# Patient Record
Sex: FEMALE
[De-identification: names, ages, dates, MRNs, and addresses within clinical notes are randomized per-mention and may not be internally consistent; named-entity substitution may affect disease eponyms.]

---

## 2017-01-06 PROBLEM — Z00.00 ENCOUNTER FOR PREVENTIVE HEALTH EXAMINATION: Status: ACTIVE | Noted: 2017-01-06

## 2017-01-09 ENCOUNTER — APPOINTMENT (OUTPATIENT)
Dept: OTOLARYNGOLOGY | Facility: CLINIC | Age: 37
End: 2017-01-09

## 2017-01-09 VITALS
TEMPERATURE: 98.3 F | SYSTOLIC BLOOD PRESSURE: 126 MMHG | HEART RATE: 80 BPM | OXYGEN SATURATION: 100 % | DIASTOLIC BLOOD PRESSURE: 86 MMHG

## 2017-01-09 VITALS — HEIGHT: 63 IN | WEIGHT: 120 LBS | BODY MASS INDEX: 21.26 KG/M2

## 2017-01-09 DIAGNOSIS — Z83.3 FAMILY HISTORY OF DIABETES MELLITUS: ICD-10-CM

## 2017-01-09 DIAGNOSIS — Z78.9 OTHER SPECIFIED HEALTH STATUS: ICD-10-CM

## 2017-01-09 DIAGNOSIS — F15.90 OTHER STIMULANT USE, UNSPECIFIED, UNCOMPLICATED: ICD-10-CM

## 2017-01-09 DIAGNOSIS — K21.9 ACUTE LARYNGITIS: ICD-10-CM

## 2017-01-09 DIAGNOSIS — M26.609 UNSPECIFIED TEMPOROMANDIBULAR JOINT DISORDER: ICD-10-CM

## 2017-01-09 DIAGNOSIS — Z80.8 FAMILY HISTORY OF MALIGNANT NEOPLASM OF OTHER ORGANS OR SYSTEMS: ICD-10-CM

## 2017-01-09 DIAGNOSIS — J04.0 ACUTE LARYNGITIS: ICD-10-CM

## 2017-01-09 RX ORDER — ALPRAZOLAM 0.5 MG/1
0.5 TABLET ORAL
Qty: 30 | Refills: 0 | Status: ACTIVE | COMMUNITY
Start: 2016-10-21

## 2017-01-09 RX ORDER — NORETHINDRONE 0.35 MG/1
0.35 TABLET ORAL
Qty: 28 | Refills: 0 | Status: ACTIVE | COMMUNITY
Start: 2016-12-16

## 2017-02-13 ENCOUNTER — CHART COPY (OUTPATIENT)
Age: 37
End: 2017-02-13

## 2017-03-16 ENCOUNTER — CHART COPY (OUTPATIENT)
Age: 37
End: 2017-03-16

## 2017-04-03 ENCOUNTER — RESULT REVIEW (OUTPATIENT)
Age: 37
End: 2017-04-03

## 2017-04-04 ENCOUNTER — OUTPATIENT (OUTPATIENT)
Dept: OUTPATIENT SERVICES | Facility: HOSPITAL | Age: 37
LOS: 1 days | End: 2017-04-04
Payer: COMMERCIAL

## 2017-04-04 DIAGNOSIS — D44.0 NEOPLASM OF UNCERTAIN BEHAVIOR OF THYROID GLAND: ICD-10-CM

## 2017-04-07 LAB — NON-GYNECOLOGICAL CYTOLOGY STUDY: SIGNIFICANT CHANGE UP

## 2017-04-17 ENCOUNTER — APPOINTMENT (OUTPATIENT)
Dept: OTOLARYNGOLOGY | Facility: CLINIC | Age: 37
End: 2017-04-17

## 2017-04-17 VITALS
TEMPERATURE: 98.6 F | HEART RATE: 73 BPM | DIASTOLIC BLOOD PRESSURE: 91 MMHG | SYSTOLIC BLOOD PRESSURE: 145 MMHG | OXYGEN SATURATION: 99 %

## 2017-04-17 DIAGNOSIS — D44.0 NEOPLASM OF UNCERTAIN BEHAVIOR OF THYROID GLAND: ICD-10-CM

## 2017-04-17 DIAGNOSIS — E04.9 NONTOXIC GOITER, UNSPECIFIED: ICD-10-CM

## 2017-04-17 DIAGNOSIS — R13.19 OTHER DYSPHAGIA: ICD-10-CM

## 2017-04-20 ENCOUNTER — RESULT REVIEW (OUTPATIENT)
Age: 37
End: 2017-04-20

## 2017-04-20 VITALS
WEIGHT: 119.93 LBS | HEIGHT: 63 IN | SYSTOLIC BLOOD PRESSURE: 117 MMHG | HEART RATE: 62 BPM | DIASTOLIC BLOOD PRESSURE: 69 MMHG | RESPIRATION RATE: 16 BRPM | OXYGEN SATURATION: 99 % | TEMPERATURE: 99 F

## 2017-04-20 RX ORDER — NORETHINDRONE ACETATE AND ETHINYL ESTRADIOL 1MG-20(24)
1-20 KIT ORAL
Qty: 28 | Refills: 0 | Status: ACTIVE | COMMUNITY
Start: 2017-04-11

## 2017-04-20 RX ORDER — RANITIDINE HYDROCHLORIDE 150 MG/1
0 TABLET, FILM COATED ORAL
Qty: 0 | Refills: 0 | COMMUNITY

## 2017-04-20 RX ORDER — VALACYCLOVIR 500 MG/1
500 TABLET, FILM COATED ORAL
Qty: 30 | Refills: 0 | Status: ACTIVE | COMMUNITY
Start: 2017-03-21

## 2017-04-20 NOTE — ASU PATIENT PROFILE, ADULT - PMH
GERD with esophagitis    Multinodular goiter (nontoxic) GERD with esophagitis  mild  Multinodular goiter (nontoxic)

## 2017-04-21 ENCOUNTER — OUTPATIENT (OUTPATIENT)
Dept: OUTPATIENT SERVICES | Facility: HOSPITAL | Age: 37
LOS: 1 days | Discharge: HOME | End: 2017-04-21
Payer: COMMERCIAL

## 2017-04-21 ENCOUNTER — APPOINTMENT (OUTPATIENT)
Dept: OTOLARYNGOLOGY | Facility: HOSPITAL | Age: 37
End: 2017-04-21

## 2017-04-21 DIAGNOSIS — Z41.1 ENCOUNTER FOR COSMETIC SURGERY: Chronic | ICD-10-CM

## 2017-04-21 DIAGNOSIS — Z98.82 BREAST IMPLANT STATUS: Chronic | ICD-10-CM

## 2017-04-21 LAB
ALBUMIN SERPL ELPH-MCNC: 3.3 G/DL — LOW (ref 3.4–5)
ANION GAP SERPL CALC-SCNC: 10 MMOL/L — SIGNIFICANT CHANGE UP (ref 9–16)
BUN SERPL-MCNC: 13 MG/DL — SIGNIFICANT CHANGE UP (ref 7–23)
CALCIUM SERPL-MCNC: 8.2 MG/DL — LOW (ref 8.5–10.5)
CHLORIDE SERPL-SCNC: 105 MMOL/L — SIGNIFICANT CHANGE UP (ref 96–108)
CO2 SERPL-SCNC: 23 MMOL/L — SIGNIFICANT CHANGE UP (ref 22–31)
CREAT SERPL-MCNC: 0.75 MG/DL — SIGNIFICANT CHANGE UP (ref 0.5–1.3)
GLUCOSE SERPL-MCNC: 149 MG/DL — HIGH (ref 70–99)
POTASSIUM SERPL-MCNC: 4.1 MMOL/L — SIGNIFICANT CHANGE UP (ref 3.5–5.3)
POTASSIUM SERPL-SCNC: 4.1 MMOL/L — SIGNIFICANT CHANGE UP (ref 3.5–5.3)
PTH-INTACT IO % DIF SERPL: 33 PG/ML — SIGNIFICANT CHANGE UP (ref 8.5–72.5)
PTH-INTACT IO % DIF SERPL: 53.8 PG/ML — SIGNIFICANT CHANGE UP (ref 8.5–72.5)
PTH-INTACT IO % DIF SERPL: 82.5 PG/ML — HIGH (ref 8.5–72.5)
SODIUM SERPL-SCNC: 138 MMOL/L — SIGNIFICANT CHANGE UP (ref 135–145)

## 2017-04-21 PROCEDURE — 60252 REMOVAL OF THYROID: CPT

## 2017-04-21 PROCEDURE — 88321 CONSLTJ&REPRT SLD PREP ELSWR: CPT

## 2017-04-21 PROCEDURE — 95865 NEEDLE EMG LARYNX: CPT | Mod: 26

## 2017-04-21 RX ORDER — HYDROMORPHONE HYDROCHLORIDE 2 MG/ML
0.5 INJECTION INTRAMUSCULAR; INTRAVENOUS; SUBCUTANEOUS
Qty: 0 | Refills: 0 | Status: DISCONTINUED | OUTPATIENT
Start: 2017-04-21 | End: 2017-04-22

## 2017-04-21 RX ORDER — ACETAMINOPHEN WITH CODEINE 300MG-30MG
1 TABLET ORAL EVERY 4 HOURS
Qty: 0 | Refills: 0 | Status: DISCONTINUED | OUTPATIENT
Start: 2017-04-21 | End: 2017-04-22

## 2017-04-21 RX ORDER — ACETAMINOPHEN WITH CODEINE 300MG-30MG
2 TABLET ORAL EVERY 4 HOURS
Qty: 0 | Refills: 0 | Status: DISCONTINUED | OUTPATIENT
Start: 2017-04-21 | End: 2017-04-22

## 2017-04-21 RX ORDER — SODIUM CHLORIDE 9 MG/ML
1000 INJECTION, SOLUTION INTRAVENOUS
Qty: 0 | Refills: 0 | Status: DISCONTINUED | OUTPATIENT
Start: 2017-04-21 | End: 2017-04-22

## 2017-04-21 RX ORDER — HYDROMORPHONE HYDROCHLORIDE 2 MG/ML
0.5 INJECTION INTRAMUSCULAR; INTRAVENOUS; SUBCUTANEOUS EVERY 4 HOURS
Qty: 0 | Refills: 0 | Status: DISCONTINUED | OUTPATIENT
Start: 2017-04-21 | End: 2017-04-21

## 2017-04-21 RX ORDER — HYDROMORPHONE HYDROCHLORIDE 2 MG/ML
1 INJECTION INTRAMUSCULAR; INTRAVENOUS; SUBCUTANEOUS EVERY 4 HOURS
Qty: 0 | Refills: 0 | Status: DISCONTINUED | OUTPATIENT
Start: 2017-04-21 | End: 2017-04-21

## 2017-04-21 RX ORDER — ONDANSETRON 8 MG/1
4 TABLET, FILM COATED ORAL EVERY 6 HOURS
Qty: 0 | Refills: 0 | Status: DISCONTINUED | OUTPATIENT
Start: 2017-04-21 | End: 2017-04-22

## 2017-04-21 RX ADMIN — HYDROMORPHONE HYDROCHLORIDE 0.5 MILLIGRAM(S): 2 INJECTION INTRAMUSCULAR; INTRAVENOUS; SUBCUTANEOUS at 19:27

## 2017-04-21 RX ADMIN — Medication 1 TABLET(S): at 22:35

## 2017-04-21 RX ADMIN — Medication 1 TABLET(S): at 23:15

## 2017-04-21 RX ADMIN — HYDROMORPHONE HYDROCHLORIDE 0.5 MILLIGRAM(S): 2 INJECTION INTRAMUSCULAR; INTRAVENOUS; SUBCUTANEOUS at 19:45

## 2017-04-21 RX ADMIN — ONDANSETRON 4 MILLIGRAM(S): 8 TABLET, FILM COATED ORAL at 22:30

## 2017-04-21 RX ADMIN — SODIUM CHLORIDE 95 MILLILITER(S): 9 INJECTION, SOLUTION INTRAVENOUS at 19:27

## 2017-04-21 NOTE — BRIEF OPERATIVE NOTE - OPERATION/FINDINGS
Preop Dx: Thyroid nodules  Postop Dx: Same as above  Procedure: Total Thyroidectomy  Findings: Total thyroidectomy with excision of central nodes.

## 2017-04-22 ENCOUNTER — TRANSCRIPTION ENCOUNTER (OUTPATIENT)
Age: 37
End: 2017-04-22

## 2017-04-22 VITALS
RESPIRATION RATE: 15 BRPM | OXYGEN SATURATION: 100 % | HEART RATE: 64 BPM | TEMPERATURE: 99 F | SYSTOLIC BLOOD PRESSURE: 120 MMHG | DIASTOLIC BLOOD PRESSURE: 69 MMHG

## 2017-04-22 LAB
ALBUMIN SERPL ELPH-MCNC: 3.2 G/DL — LOW (ref 3.4–5)
ANION GAP SERPL CALC-SCNC: 8 MMOL/L — LOW (ref 9–16)
BUN SERPL-MCNC: 10 MG/DL — SIGNIFICANT CHANGE UP (ref 7–23)
CALCIUM SERPL-MCNC: 7.9 MG/DL — LOW (ref 8.5–10.5)
CHLORIDE SERPL-SCNC: 108 MMOL/L — SIGNIFICANT CHANGE UP (ref 96–108)
CO2 SERPL-SCNC: 24 MMOL/L — SIGNIFICANT CHANGE UP (ref 22–31)
CREAT SERPL-MCNC: 0.71 MG/DL — SIGNIFICANT CHANGE UP (ref 0.5–1.3)
GLUCOSE SERPL-MCNC: 112 MG/DL — HIGH (ref 70–99)
HCT VFR BLD CALC: 36.3 % — SIGNIFICANT CHANGE UP (ref 34.5–45)
HGB BLD-MCNC: 12.1 G/DL — SIGNIFICANT CHANGE UP (ref 11.5–15.5)
MAGNESIUM SERPL-MCNC: 1.9 MG/DL — SIGNIFICANT CHANGE UP (ref 1.6–2.4)
MCHC RBC-ENTMCNC: 29.7 PG — SIGNIFICANT CHANGE UP (ref 27–34)
MCHC RBC-ENTMCNC: 33.3 G/DL — SIGNIFICANT CHANGE UP (ref 32–36)
MCV RBC AUTO: 89 FL — SIGNIFICANT CHANGE UP (ref 80–100)
PHOSPHATE SERPL-MCNC: 3.6 MG/DL — SIGNIFICANT CHANGE UP (ref 2.5–4.5)
PLATELET # BLD AUTO: 215 K/UL — SIGNIFICANT CHANGE UP (ref 150–400)
POTASSIUM SERPL-MCNC: 3.9 MMOL/L — SIGNIFICANT CHANGE UP (ref 3.5–5.3)
POTASSIUM SERPL-SCNC: 3.9 MMOL/L — SIGNIFICANT CHANGE UP (ref 3.5–5.3)
RBC # BLD: 4.08 M/UL — SIGNIFICANT CHANGE UP (ref 3.8–5.2)
RBC # FLD: 13 % — SIGNIFICANT CHANGE UP (ref 10.3–16.9)
SODIUM SERPL-SCNC: 140 MMOL/L — SIGNIFICANT CHANGE UP (ref 135–145)
WBC # BLD: 11.9 K/UL — HIGH (ref 3.8–10.5)
WBC # FLD AUTO: 11.9 K/UL — HIGH (ref 3.8–10.5)

## 2017-04-22 PROCEDURE — 60220 PARTIAL REMOVAL OF THYROID: CPT

## 2017-04-22 PROCEDURE — 88307 TISSUE EXAM BY PATHOLOGIST: CPT

## 2017-04-22 PROCEDURE — 36415 COLL VENOUS BLD VENIPUNCTURE: CPT

## 2017-04-22 PROCEDURE — 38510 BIOPSY/REMOVAL LYMPH NODES: CPT

## 2017-04-22 PROCEDURE — 84100 ASSAY OF PHOSPHORUS: CPT

## 2017-04-22 PROCEDURE — 82040 ASSAY OF SERUM ALBUMIN: CPT

## 2017-04-22 PROCEDURE — 83735 ASSAY OF MAGNESIUM: CPT

## 2017-04-22 PROCEDURE — 85027 COMPLETE CBC AUTOMATED: CPT

## 2017-04-22 PROCEDURE — 80048 BASIC METABOLIC PNL TOTAL CA: CPT

## 2017-04-22 PROCEDURE — 83970 ASSAY OF PARATHORMONE: CPT

## 2017-04-22 RX ADMIN — Medication 1 TABLET(S): at 08:18

## 2017-04-22 RX ADMIN — Medication 1 TABLET(S): at 09:10

## 2017-04-22 RX ADMIN — Medication 1 TABLET(S): at 04:00

## 2017-04-22 RX ADMIN — Medication 1 TABLET(S): at 03:23

## 2017-04-22 NOTE — PROGRESS NOTE ADULT - SUBJECTIVE AND OBJECTIVE BOX
O/N: Afebrile, VSS. Labs wnl. advanced to soft diet for am, HLIVF    STATUS POST: total thyroidectomy     POST OP DAY #: 1 O/N: Afebrile, VSS. Labs wnl. advanced to soft diet for am, HLIVF    STATUS POST: total thyroidectomy     POST OP DAY #: 1    SUBJECTIVE: Pt seen and examined at bedside. No overnight events.  Pain controlled. No f/c/n/v.     Vital Signs Last 24 Hrs  T(C): 37, Max: 37 (04-22 @ 08:16)  T(F): 98.6, Max: 98.6 (04-22 @ 08:16)  HR: 64 (62 - 100)  BP: 120/69 (120/69 - 156/79)  BP(mean): --  RR: 15 (15 - 18)  SpO2: 100% (97% - 100%)    PHYSICAL EXAM    Gen: NAD  CV: RRR  Neck, soft, no hematoma, drain in place  Pulm: CTABL  Abd: Soft, NT/ND    I&O's Detail    I & Os for current day (as of 22 Apr 2017 08:18)  =============================================  IN:    Other: 1300 ml    lactated ringers.: 855 ml    Oral Fluid: 240 ml    Total IN: 2395 ml  ---------------------------------------------  OUT:    Voided: 1050 ml    Other: 50 ml    Bulb: 10 ml    Total OUT: 1110 ml  ---------------------------------------------  Total NET: 1285 ml      LABS:                        12.1   11.9  )-----------( 215      ( 22 Apr 2017 06:54 )             36.3     04-22    140  |  108  |  10  ----------------------------<  112<H>  3.9   |  24  |  0.71    Ca    7.9<L>      22 Apr 2017 06:54  Phos  3.6     04-22  Mg     1.9     04-22    TPro  x   /  Alb  3.2<L>  /  TBili  x   /  DBili  x   /  AST  x   /  ALT  x   /  AlkPhos  x   04-22          MEDICATIONS  (STANDING):    MEDICATIONS  (PRN):  HYDROmorphone  Injectable 0.5milliGRAM(s) IV Push every 1 hour PRN Severe Pain (7 - 10)  acetaminophen 300 mG/codeine 30 mG 1Tablet(s) Oral every 4 hours PRN Moderate Pain (4 - 6)  acetaminophen 300 mG/codeine 30 mG 2Tablet(s) Oral every 4 hours PRN Severe Pain (7 - 10) - try PO option first  ondansetron Injectable 4milliGRAM(s) IV Push every 6 hours PRN Nausea and/or Vomiting      RADIOLOGY & ADDITIONAL STUDIES:    ASSESSMENT AND PLAN

## 2017-04-22 NOTE — PROGRESS NOTE ADULT - ASSESSMENT
36yFemale s/p above procedure    Soft diet  Pain/nausea control  AM labs  DC drain and DC patient home

## 2017-04-22 NOTE — DISCHARGE NOTE ADULT - CARE PLAN
Principal Discharge DX:	Multinodular goiter (nontoxic)  Goal:	Recovery  Instructions for follow-up, activity and diet:	Please follow up with Dr Block for your scheduled appointment.   Continue taking 2 Citracal once a day.  You may remove ACE bandage in the evening. You may shower, no baths, leave steri strips in place, they will fall off on their own.  Call your doctor or go to the ED if you develop fever >101.5, chills, nausea, vomiting, difficulty breathing or bleeding

## 2017-04-22 NOTE — DISCHARGE NOTE ADULT - PATIENT PORTAL LINK FT
“You can access the [facility] Patient Portal, offered by [facility], by registering with the following website: [email or web address]”

## 2017-04-22 NOTE — DISCHARGE NOTE ADULT - PLAN OF CARE
Recovery Please follow up with Dr Block for your scheduled appointment.   Continue taking 2 Citracal once a day.  You may remove ACE bandage in the evening. You may shower, no baths, leave steri strips in place, they will fall off on their own.  Call your doctor or go to the ED if you develop fever >101.5, chills, nausea, vomiting, difficulty breathing or bleeding

## 2017-04-22 NOTE — DISCHARGE NOTE ADULT - HOSPITAL COURSE
35 yo F with multinodular goiter presented for total thyroidectomy. Her operation and post-operative course were unremarkable. She tolerated advancement of diet, ambulating without difficulty, voiding spontaneously, and pain was well controlled. On day of discharge, her drain was d/c'd and she was stable to return home.

## 2017-04-22 NOTE — DISCHARGE NOTE ADULT - MEDICATION SUMMARY - MEDICATIONS TO TAKE
I will START or STAY ON the medications listed below when I get home from the hospital:    Zantac  --  by mouth   -- Indication: For GERD

## 2017-04-26 DIAGNOSIS — K21.9 GASTRO-ESOPHAGEAL REFLUX DISEASE WITHOUT ESOPHAGITIS: ICD-10-CM

## 2017-04-26 DIAGNOSIS — C73 MALIGNANT NEOPLASM OF THYROID GLAND: ICD-10-CM

## 2017-04-26 LAB — SURGICAL PATHOLOGY STUDY: SIGNIFICANT CHANGE UP

## 2017-04-27 ENCOUNTER — LABORATORY RESULT (OUTPATIENT)
Age: 37
End: 2017-04-27

## 2017-04-27 ENCOUNTER — APPOINTMENT (OUTPATIENT)
Dept: OTOLARYNGOLOGY | Facility: CLINIC | Age: 37
End: 2017-04-27

## 2017-04-27 VITALS
TEMPERATURE: 98.2 F | SYSTOLIC BLOOD PRESSURE: 135 MMHG | HEART RATE: 66 BPM | DIASTOLIC BLOOD PRESSURE: 94 MMHG | OXYGEN SATURATION: 99 %

## 2017-04-27 DIAGNOSIS — R49.9 UNSPECIFIED VOICE AND RESONANCE DISORDER: ICD-10-CM

## 2017-04-27 RX ORDER — AZITHROMYCIN DIHYDRATE 500 MG/1
500 TABLET, FILM COATED ORAL DAILY
Qty: 1 | Refills: 0 | Status: COMPLETED | COMMUNITY
Start: 2017-04-20 | End: 2017-04-27

## 2017-04-27 RX ORDER — LEVOTHYROXINE SODIUM 125 UG/1
125 TABLET ORAL DAILY
Qty: 60 | Refills: 3 | Status: ACTIVE | COMMUNITY
Start: 2017-04-27 | End: 1900-01-01

## 2017-04-27 RX ORDER — LEVOTHYROXINE SODIUM 0.12 MG/1
125 TABLET ORAL DAILY
Qty: 60 | Refills: 3 | Status: DISCONTINUED | COMMUNITY
Start: 2017-04-27 | End: 2017-04-27

## 2017-04-27 RX ORDER — ACETAMINOPHEN AND CODEINE 300; 30 MG/1; MG/1
300-30 TABLET ORAL
Qty: 30 | Refills: 0 | Status: COMPLETED | COMMUNITY
Start: 2017-04-20 | End: 2017-04-27

## 2017-04-27 RX ORDER — CALCITRIOL 0.5 UG/1
0.5 CAPSULE, LIQUID FILLED ORAL
Qty: 5 | Refills: 0 | Status: DISCONTINUED | COMMUNITY
Start: 2017-04-20 | End: 2017-04-27

## 2017-05-01 PROBLEM — E04.2 NONTOXIC MULTINODULAR GOITER: Chronic | Status: ACTIVE | Noted: 2017-04-20

## 2017-05-01 PROBLEM — K21.0 GASTRO-ESOPHAGEAL REFLUX DISEASE WITH ESOPHAGITIS: Chronic | Status: ACTIVE | Noted: 2017-04-20

## 2017-05-11 ENCOUNTER — APPOINTMENT (OUTPATIENT)
Dept: OTOLARYNGOLOGY | Facility: CLINIC | Age: 37
End: 2017-05-11

## 2017-05-11 VITALS — HEART RATE: 66 BPM | SYSTOLIC BLOOD PRESSURE: 127 MMHG | DIASTOLIC BLOOD PRESSURE: 77 MMHG | OXYGEN SATURATION: 98 %

## 2018-07-22 PROBLEM — Z78.9 ALCOHOL USE: Status: ACTIVE | Noted: 2017-01-09

## 2019-03-18 ENCOUNTER — APPOINTMENT (OUTPATIENT)
Dept: OTOLARYNGOLOGY | Facility: CLINIC | Age: 39
End: 2019-03-18
Payer: COMMERCIAL

## 2019-03-18 VITALS
HEART RATE: 79 BPM | SYSTOLIC BLOOD PRESSURE: 132 MMHG | DIASTOLIC BLOOD PRESSURE: 81 MMHG | OXYGEN SATURATION: 98 % | TEMPERATURE: 98.6 F

## 2019-03-18 DIAGNOSIS — E89.0 POSTPROCEDURAL HYPOTHYROIDISM: ICD-10-CM

## 2019-03-18 DIAGNOSIS — R22.1 LOCALIZED SWELLING, MASS AND LUMP, NECK: ICD-10-CM

## 2019-03-18 DIAGNOSIS — C73 MALIGNANT NEOPLASM OF THYROID GLAND: ICD-10-CM

## 2019-03-18 PROCEDURE — 31575 DIAGNOSTIC LARYNGOSCOPY: CPT

## 2019-03-18 PROCEDURE — 99214 OFFICE O/P EST MOD 30 MIN: CPT | Mod: 25

## 2019-03-18 PROCEDURE — 76536 US EXAM OF HEAD AND NECK: CPT

## 2019-03-19 NOTE — REASON FOR VISIT
[FreeTextEntry2] : a follow up visit s/p total thyroidectomy for papillary carcinoma. [FreeTextEntry1] : Ob/Gyn is Julian Denise MD, Endocrinologist is Misti Cox MD [place], [place]

## 2019-03-19 NOTE — HISTORY OF PRESENT ILLNESS
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  She delivered her fourth child in July. She is not breast feeding now.  She feels a generalized sense of discomfort when swallowing liquids or occasionally solids get caught requiring repeated swallows.  She was seen by GI and an upper GI endoscopy showed mild esophagitis/ GERD a year ago.  She uses Zantac PRN.  She had a thyroid ultrasound dated 12/7/16 indicating a borderline enlarged right thyroid lobe measuring 5.3 cm in sagittal height and 2 dominant nodules within the right lobe.  A 1.5 CM solid nodule with coarse calcifications centrally and a 2.1 CM cystic and solid nodule the lower pole.  The left thyroid lobe contains 3 subcentimeter nodules ranging in size from 2-5 mm without suspicious features and probably represent colloid cysts. She had an ultrasound guided FNA biopsy from both dominant right lobe nodules and both were cytologically benign with a background of lymphocytic thyroiditis. Her TSH is normal and she does not have antithyroid antibodies. Because of her family history of thyroid cancer, she is concerned about an audible sound that appears to be prominent when she swallows liquids and a generalized sense of pressure and swallowing for the past 5 years that seems to be getting worse.  Nothing seems to make the symptoms worse or better.  She has had severe GERD during her pregnancies but then after delivery is easily managed as needed with antacids. She denies regurgitation of food.  She denies recent voice changes, shortness of breath, or neck pain.  Her weight has been stable. She also describes a sharp pain just behind her right ear and jaw but does have a possible history of bruxism and ascribes the pain to this intermittently for 2 years.  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 She has mild pressure symptoms from the goiter and concerned about noisy swallowing and occasional mild dysphagia. Despite benign biopsies in the past, because of her family history of thyroid cancer and her concern that her sister also initially had a benign FNA, additional FNA biopsies were obtained with genomics last month.  This revealed conflicting data with both nodules interpreted by [facility] cytopathologists and [facility] cytopathologists as atypia of undetermined significance, Talcott category III. The more superior nodule was interpreted as benign by [facility] cytopathologists but the ThyeoSeq mutation panel detected an NRAS mutation with the possibility of a non-invasive follicular neoplasm or less likely, a minimally invasive follicular carcinoma or NIFTP.  The more inferior nodule was also reported as AUS, Talcott III by the [facility] group and suspicious on the [employer]fr[employer] GEC but had no mutations on the ThyroSeq mutation panel.  Because of the FNA cytology and genomic findings and the uncertain risk of malignancy for both nodules, symptoms of pressure and mild dysphagia, as well as her anxiety with her sister’s diagnosis of papillary thyroid carcinoma, she would like to proceed with total thyroidectomy.  \par   [FreeTextEntry1] : Marlin had an uneventful total thyroidectomy on 04/21/17.  She was discharged the same day with a normal postoperative calcium level. Her has healed well. Vocal fold mobility was normal postop.  She is tolerating LT4 150 mcg.  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 She is being followed by her Endocrinologist. She had an US of the neck last year that was JENNA. She feels that there has been some swelling above the incision in the midline and concerned about growth of another nodule or neck swelling slowly more prominent over the past 6 months. Her last TSH was 2.1, Tg < 0.1, Tg antibodies negative. Marlin denies recent shortness of breath, voice changes, dysphagia, anterior neck pain, neck pressure or mass. Her weight has been stable plus/ minus 5 lbs.

## 2019-03-19 NOTE — PROCEDURE
[Image(s) Captured] : image(s) captured and filed [Dysphagia] : dysphagia not clearly evaluated by indirect laryngoscopy [Globus] : globus [Topical Lidocaine] : topical lidocaine [Serial Number: ___] : Serial Number: [unfilled] [Oxymetazoline HCl] : oxymetazoline HCl [FreeTextEntry1] :  Physician performed high-resolution ultrasound gray scale imaging and color Doppler supplementation of the thyroid gland and neck was obtained in the longitudinal and transverse planes using a 13 MHz linear transducer with image capture.  All measurements are in centimeters (longitudinal x AP x transverse).  \par  [FreeTextEntry2] : papillary thyroid carcinoma with concerns regarding swelling in the anterior neck over the past several months [FreeTextEntry3] : see dictated  report:\par \par Preliminary findings: There is no evidence of residual thyroid tissue in the surgical bed. There is no soft tissue mass anteriorly. There is no good imaging correlates for the findings of fullness in the anterior neck. All imaged cervical lymph nodes were not enlarged and morphologically normal in appearance.  [de-identified] : The nasal septum is minimally deviated to the right with crusting and old heme. There are no masses or polyps and the nasal mucosa and secretions are normal. The choanae and posterior nasopharynx are normal without masses or drainage. The Eustachian tube orifices appear patent. The pharynx, including the posterior and lateral pharyngeal walls, the vallecula and base of tongue are normal without ulcerations, lesions or masses. The hypopharynx including the pyriform sinuses open well without pooling of secretions, mucosal lesions or masses. The supraglottic larynx including the epiglottis, petiole, glossoepiglottic folds and pharyngoepiglottic folds are normal without mucosal lesions, ulcerations or masses. The glottis reveals normal false vocal folds. The true vocal folds are glistening white, tense and of equal length, without paralysis, having symmetric mobility on adduction and abduction. There are no mucosal lesions, nodules, cysts, erythroplasia or leukoplakia. The posterior cricoid area has healthy pink mucosa in the interarytenoid area and esophageal inlet. There is minimal thickening/edema of the interarytenoid mucosa suggestive of posterior laryngitis from laryngopharyngeal acid reflux disease. The trachea is clear without narrowing, deviation or lesions. \par  [de-identified] : GERD

## 2019-03-19 NOTE — CONSULT LETTER
[Dear  ___] : Dear  [unfilled], [Consult Letter:] : I had the pleasure of evaluating your patient, [unfilled]. [Please see my note below.] : Please see my note below. [Consult Closing:] : Thank you very much for allowing me to participate in the care of this patient.  If you have any questions, please do not hesitate to contact me. [Sincerely,] : Sincerely, [FreeTextEntry3] : \par Kamran Block M.D., FACS, ECNU\par Director [facility]\par [facility]\par Certified in Thyroid/Parathyroid/Neck Ultrasound, ECNU/ AIUM\par \par , Department of Otolaryngology\par [facility] [employer] at [facility]\par

## 2022-04-11 PROBLEM — J04.0 REFLUX LARYNGITIS: Status: ACTIVE | Noted: 2017-01-09
